# Patient Record
Sex: FEMALE | Race: WHITE
[De-identification: names, ages, dates, MRNs, and addresses within clinical notes are randomized per-mention and may not be internally consistent; named-entity substitution may affect disease eponyms.]

---

## 2020-05-25 ENCOUNTER — HOSPITAL ENCOUNTER (INPATIENT)
Dept: HOSPITAL 54 - ER | Age: 85
LOS: 1 days | Discharge: HOME HEALTH | DRG: 305 | End: 2020-05-26
Attending: NURSE PRACTITIONER | Admitting: FAMILY MEDICINE
Payer: MEDICARE

## 2020-05-25 VITALS — DIASTOLIC BLOOD PRESSURE: 85 MMHG | SYSTOLIC BLOOD PRESSURE: 177 MMHG

## 2020-05-25 VITALS — HEIGHT: 62 IN | WEIGHT: 184 LBS | BODY MASS INDEX: 33.86 KG/M2

## 2020-05-25 VITALS — SYSTOLIC BLOOD PRESSURE: 149 MMHG | DIASTOLIC BLOOD PRESSURE: 78 MMHG

## 2020-05-25 DIAGNOSIS — G47.00: ICD-10-CM

## 2020-05-25 DIAGNOSIS — Z79.899: ICD-10-CM

## 2020-05-25 DIAGNOSIS — E66.9: ICD-10-CM

## 2020-05-25 DIAGNOSIS — I16.9: Primary | ICD-10-CM
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
## 2020-05-25 DIAGNOSIS — K21.9: ICD-10-CM

## 2020-05-25 DIAGNOSIS — I10: ICD-10-CM

## 2020-05-25 DIAGNOSIS — Z86.73: ICD-10-CM

## 2020-05-25 DIAGNOSIS — Z98.890: ICD-10-CM

## 2020-05-25 LAB
ALBUMIN SERPL BCP-MCNC: 3.6 G/DL (ref 3.4–5)
ALP SERPL-CCNC: 54 U/L (ref 46–116)
ALT SERPL W P-5'-P-CCNC: 15 U/L (ref 12–78)
AST SERPL W P-5'-P-CCNC: 15 U/L (ref 15–37)
BASOPHILS # BLD AUTO: 0 /CMM (ref 0–0.2)
BASOPHILS NFR BLD AUTO: 0.5 % (ref 0–2)
BILIRUB SERPL-MCNC: 1 MG/DL (ref 0.2–1)
BUN SERPL-MCNC: 14 MG/DL (ref 7–18)
CALCIUM SERPL-MCNC: 9.6 MG/DL (ref 8.5–10.1)
CHLORIDE SERPL-SCNC: 105 MMOL/L (ref 98–107)
CO2 SERPL-SCNC: 31 MMOL/L (ref 21–32)
CREAT SERPL-MCNC: 0.8 MG/DL (ref 0.6–1.3)
EOSINOPHIL NFR BLD AUTO: 0.8 % (ref 0–6)
GLUCOSE SERPL-MCNC: 104 MG/DL (ref 74–106)
HCT VFR BLD AUTO: 42 % (ref 33–45)
HGB BLD-MCNC: 14 G/DL (ref 11.5–14.8)
LIPASE SERPL-CCNC: 199 U/L (ref 73–393)
LYMPHOCYTES NFR BLD AUTO: 1.1 /CMM (ref 0.8–4.8)
LYMPHOCYTES NFR BLD AUTO: 22.3 % (ref 20–44)
MCHC RBC AUTO-ENTMCNC: 33 G/DL (ref 31–36)
MCV RBC AUTO: 83 FL (ref 82–100)
MONOCYTES NFR BLD AUTO: 0.3 /CMM (ref 0.1–1.3)
MONOCYTES NFR BLD AUTO: 6.5 % (ref 2–12)
NEUTROPHILS # BLD AUTO: 3.4 /CMM (ref 1.8–8.9)
NEUTROPHILS NFR BLD AUTO: 69.9 % (ref 43–81)
PH UR STRIP: 7 [PH] (ref 5–8)
PLATELET # BLD AUTO: 172 /CMM (ref 150–450)
POTASSIUM SERPL-SCNC: 4.2 MMOL/L (ref 3.5–5.1)
PROT SERPL-MCNC: 7.6 G/DL (ref 6.4–8.2)
RBC # BLD AUTO: 5.08 MIL/UL (ref 4–5.2)
SODIUM SERPL-SCNC: 142 MMOL/L (ref 136–145)
UROBILINOGEN UR STRIP-MCNC: 0.2 EU/DL
WBC NRBC COR # BLD AUTO: 4.8 K/UL (ref 4.3–11)

## 2020-05-25 PROCEDURE — G0378 HOSPITAL OBSERVATION PER HR: HCPCS

## 2020-05-25 NOTE — NUR
TELE RN NOTES



COMPLAINED OF 5/10 HEADACHE, NORCO 5/325 GIVEN BY MOUTH. NON-PHARMACOLOGICAL INTERVENTIONS 
PROVIDED. VITAL SIGNS WNL. WILL CONTINUE TO MONITOR.

## 2020-05-25 NOTE — NUR
-------------------------------------------------------------------------------

          *** Note undone in [facility] - 05/25/20 at 1458 by WANDY ***           

-------------------------------------------------------------------------------

PT IS WHEELED TO CT SCAN VIA [facility].

## 2020-05-25 NOTE — NUR
rn notes

 Patient admitted from ER Tele, 84 y/old female on Dx of Dizziness. HTN.  patient Patient 
A/O  x4, refused pain at this time, was complaining of dizziness.  Patient living alone 
visited  daughter  from [place]. V/S taken bp -177/85, p-90, r-20, o2-97 room air, t-98.3. 
Tele monitor on SR -83. Assist patient to the bathroom. Safety precaution maintained all the 
time, call light within to reach. Endorsed oncoming nurse for completion of admission  on 
computer.

## 2020-05-25 NOTE — NUR
PT BIB DAUGHTER FROM HOME C/O DIZZNESS FOR 2 WEEKS, PT IS AAOX2, NOT IN 
RESPIRATORY DISTRESS, HOOKED TO CARDIAC MONITOR, KEPT RESTED AND COMFORTABLE, 
WILL CONTINUE TO MONITOR.

## 2020-05-25 NOTE — NUR
TELE RN OPENING NOTES



RECEIVED PATIENT FROM MORNING SHIFT, ALERT AND ORIENTED X 3. VERBALLY RESPONSIVE AND ABLE TO 
FOLLOW DIRECTIONS. BREATHING REGULAR AND UNLABORED ON ROOM AIR. LEFT FOREARM G20 IV LINE 
INTACT AND PATENT, FLUSHING WELL WITH NO BLEEDING AND S/S OF INFILTRATION NOTED. ON CARDIAC 
MONITORING WITH NSR AT 87bpm. NO COMPLAINTS OF DIZZINESS OR PAIN/DISCOMFORT REPORTED AT THIS 
TIME. BED LOW AND LOCKED ON SEMI FOWLERS POSITION. CALL LIGHT IN REACH. WILL CONTINUE TO 
MONITOR.

## 2020-05-25 NOTE — NUR
TELE RN NOTES



REFUSED DVT PUMPS PER PATIENT SHE OFTEN WALK GOING TO THE RESTROOM AND DOESN'T NEED IT. RISK 
AND BENEFITS EXPLAINED. MD NOTIFIED WITH NO ORDERS FOR ANTICOAGULANT AT THIS TIME. WILL 
CONTINUE TO MONITOR.

## 2020-05-26 VITALS — DIASTOLIC BLOOD PRESSURE: 69 MMHG | SYSTOLIC BLOOD PRESSURE: 129 MMHG

## 2020-05-26 VITALS — DIASTOLIC BLOOD PRESSURE: 79 MMHG | SYSTOLIC BLOOD PRESSURE: 146 MMHG

## 2020-05-26 VITALS — SYSTOLIC BLOOD PRESSURE: 132 MMHG | DIASTOLIC BLOOD PRESSURE: 62 MMHG

## 2020-05-26 VITALS — SYSTOLIC BLOOD PRESSURE: 146 MMHG | DIASTOLIC BLOOD PRESSURE: 72 MMHG

## 2020-05-26 VITALS — SYSTOLIC BLOOD PRESSURE: 146 MMHG | DIASTOLIC BLOOD PRESSURE: 73 MMHG

## 2020-05-26 VITALS — SYSTOLIC BLOOD PRESSURE: 126 MMHG | DIASTOLIC BLOOD PRESSURE: 70 MMHG

## 2020-05-26 LAB
BASOPHILS # BLD AUTO: 0 /CMM (ref 0–0.2)
BASOPHILS NFR BLD AUTO: 0.6 % (ref 0–2)
BUN SERPL-MCNC: 17 MG/DL (ref 7–18)
CALCIUM SERPL-MCNC: 8.9 MG/DL (ref 8.5–10.1)
CHLORIDE SERPL-SCNC: 105 MMOL/L (ref 98–107)
CHOLEST SERPL-MCNC: 212 MG/DL (ref ?–200)
CO2 SERPL-SCNC: 28 MMOL/L (ref 21–32)
CREAT SERPL-MCNC: 0.7 MG/DL (ref 0.6–1.3)
EOSINOPHIL NFR BLD AUTO: 1 % (ref 0–6)
GLUCOSE SERPL-MCNC: 104 MG/DL (ref 74–106)
HCT VFR BLD AUTO: 41 % (ref 33–45)
HDLC SERPL-MCNC: 78 MG/DL (ref 40–60)
HGB BLD-MCNC: 13.3 G/DL (ref 11.5–14.8)
LDLC SERPL DIRECT ASSAY-MCNC: 120 MG/DL (ref 0–99)
LYMPHOCYTES NFR BLD AUTO: 1.3 /CMM (ref 0.8–4.8)
LYMPHOCYTES NFR BLD AUTO: 27.6 % (ref 20–44)
MAGNESIUM SERPL-MCNC: 2.3 MG/DL (ref 1.8–2.4)
MCHC RBC AUTO-ENTMCNC: 33 G/DL (ref 31–36)
MCV RBC AUTO: 83 FL (ref 82–100)
MONOCYTES NFR BLD AUTO: 0.5 /CMM (ref 0.1–1.3)
MONOCYTES NFR BLD AUTO: 10.1 % (ref 2–12)
NEUTROPHILS # BLD AUTO: 3 /CMM (ref 1.8–8.9)
NEUTROPHILS NFR BLD AUTO: 60.7 % (ref 43–81)
PHOSPHATE SERPL-MCNC: 4.5 MG/DL (ref 2.5–4.9)
PLATELET # BLD AUTO: 162 /CMM (ref 150–450)
POTASSIUM SERPL-SCNC: 3.7 MMOL/L (ref 3.5–5.1)
RBC # BLD AUTO: 4.87 MIL/UL (ref 4–5.2)
SODIUM SERPL-SCNC: 141 MMOL/L (ref 136–145)
TRIGL SERPL-MCNC: 81 MG/DL (ref 30–150)
TSH SERPL DL<=0.005 MIU/L-ACNC: 0.97 UIU/ML (ref 0.36–3.74)
WBC NRBC COR # BLD AUTO: 4.9 K/UL (ref 4.3–11)

## 2020-05-26 NOTE — NUR
received pt. this am alert and oriented x3.no specific complaints.reluctant to take 
meds.instructed to call for help when getting oob.

## 2020-05-26 NOTE — NUR
rohan in and dc order given after troponin done and pt. up walking.vs taken after 
exercise.see graphic.all papers signed including belonging sheet.given rxs. taken to lobby 
by cna in w/c to be picked up by dtr.meds obtained from pharm.a.ammon np spoke to dtr. on 
phone.

## 2020-05-26 NOTE — NUR
TELE RN CLOSING NOTES



PATIENT IN BED ALERT AND ORIENTED X 3. AFEBRILE WITH NO S/S OF DISTRESS OBSERVED. LEFT 
FOREARM G20 IV LINE PATENT AND FLUSHING WELL. MAINTAINED ON CARDIAC MONITORING WITH NSR AT 
68bpm. NO COMPLAINTS OF DIZZINESS OR PAIN/DISCOMFORT REPORTED AT THIS TIME. BED LOW AND 
LOCKED ON SEMI FOWLERS POSITION. CALL LIGHT IN REACH. WILL ENDORSE TO MORNING SHIFT FOR YO.

## 2020-05-26 NOTE — NUR
TELE RN NOTES



COMPLAINED OF INABILITY TO SLEEP, AMBIEN 5MG GIVEN BY MOUTH. NON-PHARMACOLOGICAL 
INTERVENTIONS PROVIDED. WILL CONTINUE TO MONITOR.

## 2021-02-04 NOTE — NUR
Post-Discharge Transitional Care Management Services or Hospital Follow Up      Luis Hidalgo   YOB: 1953    Date of Office Visit:  2/4/2021  Date of Hospital Admission: 1/26/2021  Date of Hospital Discharge: 01/27/2021    Care management risk score Rising risk (score 2-5) and Complex Care (Scores >=6): 1     Non face to face  following discharge, date last encounter closed (first attempt may have been earlier): 1/26/2021  3:53 PM 1/26/2021  3:53 PM    Call initiated 2 business days of discharge: Yes    Patient Active Problem List   Diagnosis    Allergic rhinitis    Bursitis of left hip    Pain, joint, multiple sites    Tubular adenoma of colon    Abdominal pain    Acute sinusitis    Acute streptococcal pharyngitis    Chronic sinusitis    Arthralgia of hip    Benign paroxysmal positional vertigo    Degeneration of intervertebral disc of cervical region    Diarrhea    Dysuria    Essential hypertension    Herpes simplex type 1 infection    Herpes zoster    Hypotension    Impaired fasting glucose    Leukopenia    Onychomycosis    Palpitations    Plantar fasciitis    Counseling on substance use and abuse    Tachycardia    Vertigo    Atopic rhinitis    Bursitis of hip    Gastroesophageal reflux disease    Hyperlipidemia    Fatty liver    Osteopenia    Arthralgia of multiple joints    Shoulder joint pain    Vitamin D deficiency       Allergies   Allergen Reactions    Ciprofloxacin Anaphylaxis    Metronidazole Anaphylaxis    Bactrim [Sulfamethoxazole-Trimethoprim] Hives       Medications listed as ordered at the time of discharge from hospital    Medications marked \"taking\" at this time  Outpatient Medications Marked as Taking for the 2/4/21 encounter (Office Visit) with JOEY Hansen NP   Medication Sig Dispense Refill    cetirizine (ZYRTEC) 5 MG tablet Take 5 mg by mouth daily      albuterol sulfate HFA (PROAIR HFA) 108 (90 Base) MCG/ACT inhaler Inhale 2 dtr. calling often. puffs into the lungs every 6 hours as needed for Wheezing or Shortness of Breath 1 Inhaler 3    budesonide-formoterol (SYMBICORT) 80-4.5 MCG/ACT AERO Inhale 2 puffs into the lungs 2 times daily 10.2 g 3    montelukast (SINGULAIR) 10 MG tablet Take 1 tablet by mouth nightly 90 tablet 1    amoxicillin-clavulanate (AUGMENTIN) 875-125 MG per tablet Take 1 tablet by mouth 2 times daily for 14 days 28 tablet 0    atorvastatin (LIPITOR) 20 MG tablet Take 1 tablet by mouth daily 90 tablet 1    Melatonin 10 MG SUBL Place under the tongue      traZODone (DESYREL) 50 MG tablet Take 1 tablet by mouth nightly 30 tablet 5    fluticasone (FLONASE) 50 MCG/ACT nasal spray 2 sprays by Nasal route daily 1 Bottle 5    Cholecalciferol (VITAMIN D3) 2000 units CAPS Take 1 capsule by mouth daily 3000 units      Omega-3 Fatty Acids (FISH OIL) 1000 MG CAPS Take 1 capsule by mouth daily 90 capsule 0    pantoprazole (PROTONIX) 40 MG tablet Take 40 mg by mouth daily      famotidine (PEPCID) 20 MG tablet Take 20 mg by mouth 2 times daily as needed       ibuprofen (ADVIL;MOTRIN) 800 MG tablet Take 800 mg by mouth 3 times daily as needed          Medications patient taking as of now reconciled against medications ordered at time of hospital discharge: Yes    Chief Complaint   Patient presents with    Follow-Up from Hospital     SOB, chest pain    Sinusitis     swollen faces and eyes       History of Present illness - Follow up of Hospital diagnosis(es): initial Cp started when carrying boxes up and down the stris. The next day happened again and went to [facility] [place] [facility]. Cxr, stress and echp all normal. Follow up with Srav[facility] next week.  has noticied over last few months she has been more short of breath. When experiences chest pain, does have SOB as well.    sinues presuure and drinage. Over a month. Right cheek swollen as well    Inpatient course: Discharge summary reviewed- see chart.     Interval history/Current status: Vitals:    02/04/21 1406   BP: 108/68   Site: Left Upper Arm   Position: Sitting   Cuff Size: Large Adult   Pulse: 83   Resp: 18   Temp: 97.3 °F (36.3 °C)   TempSrc: Temporal   SpO2: 98%   Weight: 158 lb 4.8 oz (71.8 kg)   Height: 5' 3\" (1.6 m)     Body mass index is 28.04 kg/m². Wt Readings from Last 3 Encounters:   02/04/21 158 lb 4.8 oz (71.8 kg)   11/02/20 161 lb (73 kg)   06/15/20 160 lb 12.8 oz (72.9 kg)     BP Readings from Last 3 Encounters:   02/04/21 108/68   11/02/20 125/80   06/15/20 118/84       Review of Systems   Constitutional: Negative for activity change, appetite change, fatigue and fever. HENT: Positive for congestion, postnasal drip and sinus pressure. Negative for rhinorrhea and sore throat. Eyes: Negative for visual disturbance. Respiratory: Positive for chest tightness and shortness of breath. Negative for cough. Cardiovascular: Positive for chest pain. Negative for palpitations. Gastrointestinal: Negative for abdominal distention, abdominal pain, constipation, diarrhea, nausea and vomiting. Genitourinary: Negative for dysuria and urgency. Allergic/Immunologic: Negative for immunocompromised state. Neurological: Negative for syncope, light-headedness and headaches. Psychiatric/Behavioral: Negative for decreased concentration, dysphoric mood, sleep disturbance and suicidal ideas. The patient is not nervous/anxious. Physical Exam:  ENT: oropharynx clear and moist with normal mucous membranes, sinus tenderness noted- maxillofacial and post-nasal drainage present- moderate amount  Pulmonary/Chest: decreased breath sounds noted- diffuse  Cardiovascular: normal rate, normal S1 and S2, no gallops, intact distal pulses and no carotid bruits    Assessment/Plan:  1. Chest pain, unspecified type  2. Shortness of breath  3.  Persistent cough  Stable however not improving; recent hospitalization  - OK DISCHARGE MEDS RECONCILED W/ CURRENT OUTPATIENT MED LIST  - SPIROMETRY-completed in office; mild obstruction noted  - albuterol sulfate HFA (PROAIR HFA) 108 (90 Base) MCG/ACT inhaler; Inhale 2 puffs into the lungs every 6 hours as needed for Wheezing or Shortness of Breath  Dispense: 1 Inhaler; Refill: 3  - montelukast (SINGULAIR) 10 MG tablet; Take 1 tablet by mouth nightly  Dispense: 90 tablet; Refill: 1    4. Pulmonary emphysema, unspecified emphysema type (HCC)  Stable   - budesonide-formoterol (SYMBICORT) 80-4.5 MCG/ACT AERO; Inhale 2 puffs into the lungs 2 times daily  Dispense: 10.2 g; Refill: 3    5. Acute bacterial sinusitis  Worsening   - amoxicillin-clavulanate (AUGMENTIN) 875-125 MG per tablet; Take 1 tablet by mouth 2 times daily for 14 days  Dispense: 28 tablet; Refill: 0    6. Environmental and seasonal allergies  Symptoms worsening   - montelukast (SINGULAIR) 10 MG tablet; Take 1 tablet by mouth nightly  Dispense: 90 tablet;  Refill: 1            Medical Decision Making: moderate complexity